# Patient Record
Sex: MALE | Race: WHITE | NOT HISPANIC OR LATINO | Employment: OTHER | URBAN - METROPOLITAN AREA
[De-identification: names, ages, dates, MRNs, and addresses within clinical notes are randomized per-mention and may not be internally consistent; named-entity substitution may affect disease eponyms.]

---

## 2023-02-21 PROBLEM — R07.81 RIB PAIN: Status: ACTIVE | Noted: 2023-02-21

## 2023-02-21 PROBLEM — M25.429: Status: ACTIVE | Noted: 2023-02-21

## 2023-02-21 PROBLEM — M54.12 CERVICAL RADICULOPATHY: Status: ACTIVE | Noted: 2023-02-21

## 2023-02-21 PROBLEM — R53.83 FATIGUE: Status: ACTIVE | Noted: 2023-02-21

## 2023-02-21 PROBLEM — M81.0 OSTEOPOROSIS: Status: ACTIVE | Noted: 2023-02-21

## 2023-02-21 PROBLEM — G95.89 CERVICAL SPINAL MASS (MULTI): Status: ACTIVE | Noted: 2023-02-21

## 2023-02-21 PROBLEM — R30.0 DYSURIA: Status: ACTIVE | Noted: 2023-02-21

## 2023-02-21 PROBLEM — S01.01XA LACERATION OF SCALP WITHOUT FOREIGN BODY: Status: ACTIVE | Noted: 2023-02-21

## 2023-02-21 PROBLEM — N28.9 RENAL INSUFFICIENCY: Status: ACTIVE | Noted: 2023-02-21

## 2023-02-21 PROBLEM — E55.9 VITAMIN D DEFICIENCY: Status: ACTIVE | Noted: 2023-02-21

## 2023-02-21 PROBLEM — E78.5 HYPERLIPEMIA: Status: ACTIVE | Noted: 2023-02-21

## 2023-02-21 PROBLEM — R52 PAIN: Status: ACTIVE | Noted: 2023-02-21

## 2023-02-21 PROBLEM — M25.529 JOINT PAIN, ELBOW: Status: ACTIVE | Noted: 2023-02-21

## 2023-02-21 PROBLEM — G80.9 CEREBRAL PALSY (MULTI): Status: ACTIVE | Noted: 2023-02-21

## 2023-02-21 PROBLEM — E03.9 HYPOTHYROIDISM: Status: ACTIVE | Noted: 2023-02-21

## 2023-02-21 PROBLEM — E53.8 VITAMIN B12 DEFICIENCY: Status: ACTIVE | Noted: 2023-02-21

## 2023-02-21 PROBLEM — M54.6 THORACIC BACK PAIN: Status: ACTIVE | Noted: 2023-02-21

## 2023-02-21 PROBLEM — R26.81 GAIT INSTABILITY: Status: ACTIVE | Noted: 2023-02-21

## 2023-02-21 PROBLEM — R35.1 NOCTURIA: Status: ACTIVE | Noted: 2023-02-21

## 2023-02-21 PROBLEM — L89.90 PRESSURE ULCER: Status: ACTIVE | Noted: 2023-02-21

## 2023-02-21 PROBLEM — U07.1 COVID-19: Status: ACTIVE | Noted: 2023-02-21

## 2023-02-21 RX ORDER — LEVOTHYROXINE SODIUM 100 UG/1
1 TABLET ORAL DAILY
COMMUNITY
Start: 2013-01-31 | End: 2023-05-15 | Stop reason: SDUPTHER

## 2023-02-21 RX ORDER — PNV NO.95/FERROUS FUM/FOLIC AC 28MG-0.8MG
1 TABLET ORAL DAILY
COMMUNITY
Start: 2015-03-09

## 2023-02-21 RX ORDER — MULTIVITAMIN
TABLET ORAL
COMMUNITY

## 2023-03-23 ENCOUNTER — OFFICE VISIT (OUTPATIENT)
Dept: PRIMARY CARE | Facility: CLINIC | Age: 63
End: 2023-03-23
Payer: MEDICARE

## 2023-03-23 VITALS
HEART RATE: 71 BPM | HEIGHT: 60 IN | SYSTOLIC BLOOD PRESSURE: 128 MMHG | BODY MASS INDEX: 28.47 KG/M2 | OXYGEN SATURATION: 98 % | WEIGHT: 145 LBS | DIASTOLIC BLOOD PRESSURE: 88 MMHG

## 2023-03-23 DIAGNOSIS — M54.12 CERVICAL RADICULOPATHY: ICD-10-CM

## 2023-03-23 DIAGNOSIS — L89.142: ICD-10-CM

## 2023-03-23 DIAGNOSIS — R35.1 NOCTURIA: ICD-10-CM

## 2023-03-23 DIAGNOSIS — D50.0 IRON DEFICIENCY ANEMIA DUE TO CHRONIC BLOOD LOSS: ICD-10-CM

## 2023-03-23 DIAGNOSIS — R30.0 DYSURIA: ICD-10-CM

## 2023-03-23 DIAGNOSIS — E03.9 ACQUIRED HYPOTHYROIDISM: ICD-10-CM

## 2023-03-23 DIAGNOSIS — M81.0 AGE RELATED OSTEOPOROSIS, UNSPECIFIED PATHOLOGICAL FRACTURE PRESENCE: ICD-10-CM

## 2023-03-23 DIAGNOSIS — E55.9 VITAMIN D DEFICIENCY: ICD-10-CM

## 2023-03-23 DIAGNOSIS — E78.2 MIXED HYPERLIPIDEMIA: ICD-10-CM

## 2023-03-23 DIAGNOSIS — G80.2 SPASTIC HEMIPLEGIC CEREBRAL PALSY (MULTI): Primary | ICD-10-CM

## 2023-03-23 DIAGNOSIS — E53.8 VITAMIN B12 DEFICIENCY: ICD-10-CM

## 2023-03-23 DIAGNOSIS — G95.89 CERVICAL SPINAL MASS (MULTI): ICD-10-CM

## 2023-03-23 DIAGNOSIS — R26.81 GAIT INSTABILITY: ICD-10-CM

## 2023-03-23 DIAGNOSIS — N28.9 RENAL INSUFFICIENCY: ICD-10-CM

## 2023-03-23 DIAGNOSIS — R52 PAIN: ICD-10-CM

## 2023-03-23 PROBLEM — U07.1 COVID-19: Status: RESOLVED | Noted: 2023-02-21 | Resolved: 2023-03-23

## 2023-03-23 PROBLEM — M54.6 THORACIC BACK PAIN: Status: RESOLVED | Noted: 2023-02-21 | Resolved: 2023-03-23

## 2023-03-23 PROBLEM — R07.81 RIB PAIN: Status: RESOLVED | Noted: 2023-02-21 | Resolved: 2023-03-23

## 2023-03-23 PROBLEM — M25.429: Status: RESOLVED | Noted: 2023-02-21 | Resolved: 2023-03-23

## 2023-03-23 PROBLEM — M25.529 JOINT PAIN, ELBOW: Status: RESOLVED | Noted: 2023-02-21 | Resolved: 2023-03-23

## 2023-03-23 PROBLEM — S01.01XA LACERATION OF SCALP WITHOUT FOREIGN BODY: Status: RESOLVED | Noted: 2023-02-21 | Resolved: 2023-03-23

## 2023-03-23 PROBLEM — R53.83 FATIGUE: Status: RESOLVED | Noted: 2023-02-21 | Resolved: 2023-03-23

## 2023-03-23 PROCEDURE — 1036F TOBACCO NON-USER: CPT | Performed by: INTERNAL MEDICINE

## 2023-03-23 PROCEDURE — 99497 ADVNCD CARE PLAN 30 MIN: CPT | Performed by: INTERNAL MEDICINE

## 2023-03-23 PROCEDURE — G0444 DEPRESSION SCREEN ANNUAL: HCPCS | Performed by: INTERNAL MEDICINE

## 2023-03-23 PROCEDURE — 99213 OFFICE O/P EST LOW 20 MIN: CPT | Performed by: INTERNAL MEDICINE

## 2023-03-23 RX ORDER — CYANOCOBALAMIN 1000 UG/ML
1000 INJECTION, SOLUTION INTRAMUSCULAR; SUBCUTANEOUS ONCE
COMMUNITY
Start: 2008-10-14 | End: 2023-06-26 | Stop reason: ALTCHOICE

## 2023-03-23 ASSESSMENT — PATIENT HEALTH QUESTIONNAIRE - PHQ9
1. LITTLE INTEREST OR PLEASURE IN DOING THINGS: NOT AT ALL
2. FEELING DOWN, DEPRESSED OR HOPELESS: NOT AT ALL
SUM OF ALL RESPONSES TO PHQ9 QUESTIONS 1 AND 2: 0

## 2023-03-23 NOTE — PROGRESS NOTES
Patient ID: Jason Cortes is a 62 y.o. male who presents for Establish Care (Here to establish care ).    Assessment/Plan     Problem List Items Addressed This Visit          Nervous    Cerebral palsy (CMS/HCC) - Primary     Left-sided spasticity advised muscle relaxant therapy         Relevant Orders    CBC and Auto Differential    Comprehensive Metabolic Panel    Cervical radiculopathy     B12 folic acid physical therapy gabapentin         Dysuria     Check PSA urine         RESOLVED: Pain       Genitourinary    Renal insufficiency     Condyle salt and protein BMP         Relevant Orders    Magnesium       Musculoskeletal    Cervical spinal mass (CMS/HCC)     Dr. Cox evaluation         Osteoporosis     Vitamin C vitamin D calcium plus rheumatology evaluation         Relevant Orders    Vitamin D, Total       Endocrine/Metabolic    Hypothyroidism     Continue Synthroid check TSH         Relevant Orders    TSH with reflex to Free T4 if abnormal    Vitamin B12 deficiency     Supplement         Relevant Orders    Vitamin B12    Iron and TIBC    Vitamin D deficiency     Advice your vitamin D level is low take vitamin C calcium plus vitamin D 50,000 unit one every week for 3 months and repeat testing or 3 months         Relevant Orders    Vitamin D, Total       Other    Gait instability    Hyperlipemia     Low-fat diet         Nocturia    Pressure ulcer     Advised bacitracin cream normal saline's local dressing wound care evaluation stage second          Other Visit Diagnoses       Iron deficiency anemia due to chronic blood loss        Relevant Orders    Iron and TIBC            Source of history: Nurse, Medical personnel, Medical record, Patient.  History limitation: None.      HPI cerebral palsy with a left hemispasticity debility ambulatory crusting pressure ulcer with the muscle mass loss cervical lumbar radiculopathy chronic anemia associate with hypothyroidism hyperlipidemia complaining arthralgia myalgia  fatigue tired muscle spasm weakness and pressure ulcer    Negative for fall    Negative for hematuria rectal bleeding    Negative for suicide    No Known Allergies    Medications    Current Outpatient Medications   Medication Sig Dispense Refill    cyanocobalamin (Vitamin B-12) 1,000 mcg/mL injection Inject 1 mL (1,000 mcg) as directed 1 time.      cyanocobalamin (Vitamin B-12) 100 mcg tablet Take 1 tablet (100 mcg) by mouth once daily.      levothyroxine (Synthroid, Levoxyl) 100 mcg tablet Take 1 tablet (100 mcg) by mouth once daily.      multivitamin tablet Multiple Vitamins TABS   Refills: 0       Active       No current facility-administered medications for this visit.       Objective   Visit Vitals  /88   Pulse 71   Ht 1.524 m (5')   Wt 65.8 kg (145 lb)   SpO2 98%   BMI 28.32 kg/m²   Smoking Status Never   BSA 1.67 m²       PHYSICAL EXAM  General: Cerebral palsy  HEENT: PERRLA. EOMI. MMM. Nares patent bl.  Cardiovascular: Heart murmur  Respiratory: Crackles arthritis of spine and hip  GI: Soft, NT abdomen. BS present x 4.   : No CVAT BL  MSK: ROM x 4. CTLS non-tender.   Extremities: Left upper lower extremity spasticity deformity.   Skin: Pressure ulcer left sacrum stage II  Neuro: Cerebral palsy psych: Mild depression     ROS arthralgia myalgia fatigue cachexia skin irritation some neuromuscular dysfunction with the spasticity secondary to neuropathy protein calorie malnutrition pressure ulcer cerebral palsy  Immunization History   Administered Date(s) Administered    Influenza, Unspecified 10/22/2022    Moderna SARS-CoV-2 Booster 10/27/2022    Moderna SARS-CoV-2 Vaccination 02/06/2021, 03/06/2021, 11/04/2021, 04/19/2022    Moderna Sars-cov-2 Bivalent Booster 10/27/2022    Tdap 07/25/2002, 08/25/2015       No visits with results within 4 Month(s) from this visit.   Latest known visit with results is:   Legacy Encounter on 11/03/2022   Component Date Value Ref Range Status    TSH 11/03/2022 2.62  0.44 -  3.98 mIU/L Final    WBC 11/03/2022 5.4  4.4 - 11.3 x10E9/L Final    nRBC 11/03/2022 0.0  0.0 - 0.0 /100 WBC Final    RBC 11/03/2022 4.71  4.50 - 5.90 x10E12/L Final    Hemoglobin 11/03/2022 13.9  13.5 - 17.5 g/dL Final    Hematocrit 11/03/2022 41.6  41.0 - 52.0 % Final    MCV 11/03/2022 88  80 - 100 fL Final    MCHC 11/03/2022 33.4  32.0 - 36.0 g/dL Final    Platelets 11/03/2022 210  150 - 450 x10E9/L Final    RDW 11/03/2022 12.9  11.5 - 14.5 % Final    Neutrophils % 11/03/2022 62.7  40.0 - 80.0 % Final    Immature Granulocytes %, Automated 11/03/2022 0.2  0.0 - 0.9 % Final    Lymphocytes % 11/03/2022 22.5  13.0 - 44.0 % Final    Monocytes % 11/03/2022 12.0  2.0 - 10.0 % Final    Eosinophils % 11/03/2022 2.6  0.0 - 6.0 % Final    Basophils % 11/03/2022 0.0  0.0 - 2.0 % Final    Neutrophils Absolute 11/03/2022 3.41  1.20 - 7.70 x10E9/L Final    Lymphocytes Absolute 11/03/2022 1.22  1.20 - 4.80 x10E9/L Final    Monocytes Absolute 11/03/2022 0.65  0.10 - 1.00 x10E9/L Final    Eosinophils Absolute 11/03/2022 0.14  0.00 - 0.70 x10E9/L Final    Basophils Absolute 11/03/2022 0.00  0.00 - 0.10 x10E9/L Final    Glucose 11/03/2022 81  74 - 99 mg/dL Final    Sodium 11/03/2022 142  136 - 145 mmol/L Final    Potassium 11/03/2022 4.3  3.5 - 5.3 mmol/L Final    Chloride 11/03/2022 105  98 - 107 mmol/L Final    Bicarbonate 11/03/2022 29  21 - 32 mmol/L Final    Anion Gap 11/03/2022 12  10 - 20 mmol/L Final    Urea Nitrogen 11/03/2022 16  6 - 23 mg/dL Final    Creatinine 11/03/2022 0.90  0.50 - 1.30 mg/dL Final    GFR MALE 11/03/2022 >90  >90 mL/min/1.73m2 Final    Calcium 11/03/2022 9.0  8.6 - 10.6 mg/dL Final    Albumin 11/03/2022 4.1  3.4 - 5.0 g/dL Final    Alkaline Phosphatase 11/03/2022 52  33 - 136 U/L Final    Total Protein 11/03/2022 6.2 (L)  6.4 - 8.2 g/dL Final    AST 11/03/2022 19  9 - 39 U/L Final    Total Bilirubin 11/03/2022 0.5  0.0 - 1.2 mg/dL Final    ALT (SGPT) 11/03/2022 20  10 - 52 U/L Final    Vitamin B-12  11/03/2022 1,137 (H)  211 - 911 pg/mL Final    Free T4 11/03/2022 1.32  0.78 - 1.48 ng/dL Final    Cholesterol 11/03/2022 170  0 - 199 mg/dL Final    HDL 11/03/2022 37.4 (A)  mg/dL Final    Cholesterol/HDL Ratio 11/03/2022 4.5   Final    LDL 11/03/2022 105 (H)  0 - 99 mg/dL Final    VLDL 11/03/2022 27  0 - 40 mg/dL Final    Triglycerides 11/03/2022 136  0 - 149 mg/dL Final       Radiology: Reviewed imaging in powerchart.  No results found.    Family History   Problem Relation Name Age of Onset    Pancreatic cancer Mother      Hypertension Other Fam Hx     Other (Malignant Neoplasm) Other Fam Hx      Social History     Socioeconomic History    Marital status: Single     Spouse name: None    Number of children: None    Years of education: None    Highest education level: None   Occupational History    None   Tobacco Use    Smoking status: Never    Smokeless tobacco: Never   Vaping Use    Vaping status: None   Substance and Sexual Activity    Alcohol use: Yes    Drug use: None    Sexual activity: None   Other Topics Concern    None   Social History Narrative    None     Social Determinants of Health     Financial Resource Strain: Not on file   Food Insecurity: Not on file   Transportation Needs: Not on file   Physical Activity: Not on file   Stress: Not on file   Social Connections: Not on file   Intimate Partner Violence: Not on file   Housing Stability: Not on file     History reviewed. No pertinent past medical history.  History reviewed. No pertinent surgical history.  * Cannot find OR log *    Charting was completed using voice recognition technology and may include unintended errors.

## 2023-05-15 DIAGNOSIS — E03.9 ACQUIRED HYPOTHYROIDISM: ICD-10-CM

## 2023-05-15 RX ORDER — LEVOTHYROXINE SODIUM 100 UG/1
100 TABLET ORAL DAILY
Qty: 90 TABLET | Refills: 3 | Status: SHIPPED | OUTPATIENT
Start: 2023-05-15

## 2023-05-16 ENCOUNTER — TELEPHONE (OUTPATIENT)
Dept: PRIMARY CARE | Facility: CLINIC | Age: 63
End: 2023-05-16

## 2023-06-26 ENCOUNTER — LAB (OUTPATIENT)
Dept: LAB | Facility: LAB | Age: 63
End: 2023-06-26
Payer: MEDICARE

## 2023-06-26 ENCOUNTER — OFFICE VISIT (OUTPATIENT)
Dept: PRIMARY CARE | Facility: CLINIC | Age: 63
End: 2023-06-26
Payer: MEDICARE

## 2023-06-26 VITALS
OXYGEN SATURATION: 98 % | HEART RATE: 74 BPM | TEMPERATURE: 97.8 F | SYSTOLIC BLOOD PRESSURE: 127 MMHG | DIASTOLIC BLOOD PRESSURE: 75 MMHG

## 2023-06-26 DIAGNOSIS — Z12.11 COLON CANCER SCREENING: ICD-10-CM

## 2023-06-26 DIAGNOSIS — L89.002: ICD-10-CM

## 2023-06-26 DIAGNOSIS — Z13.820 SCREENING FOR OSTEOPOROSIS: ICD-10-CM

## 2023-06-26 DIAGNOSIS — R73.9 HYPERGLYCEMIA: ICD-10-CM

## 2023-06-26 DIAGNOSIS — E55.9 VITAMIN D DEFICIENCY: ICD-10-CM

## 2023-06-26 DIAGNOSIS — G80.1 SPASTIC DIPLEGIC CEREBRAL PALSY (MULTI): Primary | ICD-10-CM

## 2023-06-26 DIAGNOSIS — M81.0 OSTEOPOROSIS: ICD-10-CM

## 2023-06-26 DIAGNOSIS — G81.10: ICD-10-CM

## 2023-06-26 DIAGNOSIS — L30.9 ECZEMA, UNSPECIFIED TYPE: ICD-10-CM

## 2023-06-26 LAB
ESTIMATED AVERAGE GLUCOSE FOR HBA1C: 103 MG/DL
HEMOGLOBIN A1C/HEMOGLOBIN TOTAL IN BLOOD: 5.2 %

## 2023-06-26 PROCEDURE — 99214 OFFICE O/P EST MOD 30 MIN: CPT | Performed by: INTERNAL MEDICINE

## 2023-06-26 PROCEDURE — 36415 COLL VENOUS BLD VENIPUNCTURE: CPT

## 2023-06-26 PROCEDURE — 1036F TOBACCO NON-USER: CPT | Performed by: INTERNAL MEDICINE

## 2023-06-26 PROCEDURE — 83036 HEMOGLOBIN GLYCOSYLATED A1C: CPT

## 2023-06-26 RX ORDER — FLUOCINONIDE 0.5 MG/G
CREAM TOPICAL 2 TIMES DAILY
Qty: 30 G | Refills: 0 | Status: SHIPPED | OUTPATIENT
Start: 2023-06-26 | End: 2023-10-03 | Stop reason: ALTCHOICE

## 2023-06-26 NOTE — PROGRESS NOTES
Patient ID: Jason Cortes is a 62 y.o. male who presents for Follow-up (3 month ).    Assessment/Plan     Problem List Items Addressed This Visit          Nervous    Cerebral palsy (CMS/HCC) - Primary     Neuropsych evaluation         Spastic hemiplegia due to noncerebrovascular etiology, unspecified laterality (CMS/HCC)     Controlled BMI blood pressure LDL cholesterol hemoglobin A1c PT OT speech therapy fall prevention            Musculoskeletal    Osteoporosis     Vitamin C vitamin D calcium Prolia advised to get DEXA scan         Relevant Orders    XR DEXA bone density       Endocrine/Metabolic    Vitamin D deficiency       Infectious/Inflammatory    Eczema       Other    Pressure ulcer     Wound care evaluation          Other Visit Diagnoses       Hyperglycemia        Relevant Orders    Hemoglobin A1C    Cologuard® colon cancer screening    Screening for osteoporosis        Relevant Orders    Hemoglobin A1C    XR DEXA bone density    Cologuard® colon cancer screening    Colon cancer screening        Relevant Orders    Hemoglobin A1C    Cologuard® colon cancer screening          Patient was evaluated today, problem list was reviewed, problems and concerns addressed, Rx list reviewed and updated, lab and tests were noted and reviewed. Life style changes were discussed, always it works better if we eat plant based diet and plenty of fibres and roughage. Consume adequate amount of water and avoid alcohol, light to moderate physical activities and stress reduction are always beneficial for ongoing physical well being. Do not forget to have 6 to 7 hours of sleep regularly and avoid late night parker screen exposure.    Source of history: Nurse, Medical personnel, Medical record, Patient.  History limitation: None.      HPI cerebral palsy with a left hemispasticity debility ambulatory crusting pressure ulcer with the muscle mass loss cervical lumbar radiculopathy chronic anemia associate with hypothyroidism hyperlipidemia  complaining arthralgia myalgia fatigue tired muscle spasm weakness and pressure ulcer  Nonspecific skin eczema advised Lidex cream locally  Negative for fall    Negative for hematuria rectal bleeding    Negative for suicide    No Known Allergies    Medications    Current Outpatient Medications   Medication Sig Dispense Refill    cyanocobalamin (Vitamin B-12) 100 mcg tablet Take 1 tablet (100 mcg) by mouth once daily.      levothyroxine (Synthroid, Levoxyl) 100 mcg tablet Take 1 tablet (100 mcg) by mouth once daily. 90 tablet 3    multivitamin tablet Multiple Vitamins TABS   Refills: 0       Active       No current facility-administered medications for this visit.       Objective   Visit Vitals  /75 (BP Location: Right arm, Patient Position: Sitting, BP Cuff Size: Adult)   Pulse 74   Temp 36.6 °C (97.8 °F)   SpO2 98%   Smoking Status Never       PHYSICAL EXAM  General: Cerebral palsy  HEENT: PERRLA. EOMI. MMM. Nares patent bl.  Cardiovascular: Heart murmur  Respiratory: Crackles arthritis of spine and hip  GI: Soft, NT abdomen. BS present x 4.   : No CVAT BL  MSK: ROM x 4. CTLS non-tender.   Extremities: Left upper lower extremity spasticity deformity.   Skin: Pressure ulcer left sacrum stage II eczema of the skin in the hand  Neuro: Cerebral palsy psych: Mild depression     ROS arthralgia myalgia fatigue cachexia skin irritation some neuromuscular dysfunction with the spasticity secondary to neuropathy protein calorie malnutrition pressure ulcer cerebral palsy  Immunization History   Administered Date(s) Administered    Influenza, Unspecified 10/22/2022    Influenza, injectable, quadrivalent, preservative free 08/25/2015, 11/18/2021, 10/22/2022    Moderna SARS-CoV-2 Booster 10/27/2022    Moderna SARS-CoV-2 Vaccination 02/06/2021, 03/06/2021, 11/04/2021, 04/19/2022    Moderna Sars-cov-2 Bivalent Booster 10/27/2022    Novel influenza-H1N1-09, preservative-free 01/24/2010    Tdap 07/25/2002, 04/13/2014,  08/25/2015    Tetanus toxoid, adsorbed 07/25/2002       No visits with results within 4 Month(s) from this visit.   Latest known visit with results is:   Legacy Encounter on 11/03/2022   Component Date Value Ref Range Status    TSH 11/03/2022 2.62  0.44 - 3.98 mIU/L Final    WBC 11/03/2022 5.4  4.4 - 11.3 x10E9/L Final    nRBC 11/03/2022 0.0  0.0 - 0.0 /100 WBC Final    RBC 11/03/2022 4.71  4.50 - 5.90 x10E12/L Final    Hemoglobin 11/03/2022 13.9  13.5 - 17.5 g/dL Final    Hematocrit 11/03/2022 41.6  41.0 - 52.0 % Final    MCV 11/03/2022 88  80 - 100 fL Final    MCHC 11/03/2022 33.4  32.0 - 36.0 g/dL Final    Platelets 11/03/2022 210  150 - 450 x10E9/L Final    RDW 11/03/2022 12.9  11.5 - 14.5 % Final    Neutrophils % 11/03/2022 62.7  40.0 - 80.0 % Final    Immature Granulocytes %, Automated 11/03/2022 0.2  0.0 - 0.9 % Final    Lymphocytes % 11/03/2022 22.5  13.0 - 44.0 % Final    Monocytes % 11/03/2022 12.0  2.0 - 10.0 % Final    Eosinophils % 11/03/2022 2.6  0.0 - 6.0 % Final    Basophils % 11/03/2022 0.0  0.0 - 2.0 % Final    Neutrophils Absolute 11/03/2022 3.41  1.20 - 7.70 x10E9/L Final    Lymphocytes Absolute 11/03/2022 1.22  1.20 - 4.80 x10E9/L Final    Monocytes Absolute 11/03/2022 0.65  0.10 - 1.00 x10E9/L Final    Eosinophils Absolute 11/03/2022 0.14  0.00 - 0.70 x10E9/L Final    Basophils Absolute 11/03/2022 0.00  0.00 - 0.10 x10E9/L Final    Glucose 11/03/2022 81  74 - 99 mg/dL Final    Sodium 11/03/2022 142  136 - 145 mmol/L Final    Potassium 11/03/2022 4.3  3.5 - 5.3 mmol/L Final    Chloride 11/03/2022 105  98 - 107 mmol/L Final    Bicarbonate 11/03/2022 29  21 - 32 mmol/L Final    Anion Gap 11/03/2022 12  10 - 20 mmol/L Final    Urea Nitrogen 11/03/2022 16  6 - 23 mg/dL Final    Creatinine 11/03/2022 0.90  0.50 - 1.30 mg/dL Final    GFR MALE 11/03/2022 >90  >90 mL/min/1.73m2 Final    Calcium 11/03/2022 9.0  8.6 - 10.6 mg/dL Final    Albumin 11/03/2022 4.1  3.4 - 5.0 g/dL Final    Alkaline Phosphatase  11/03/2022 52  33 - 136 U/L Final    Total Protein 11/03/2022 6.2 (L)  6.4 - 8.2 g/dL Final    AST 11/03/2022 19  9 - 39 U/L Final    Total Bilirubin 11/03/2022 0.5  0.0 - 1.2 mg/dL Final    ALT (SGPT) 11/03/2022 20  10 - 52 U/L Final    Vitamin B-12 11/03/2022 1,137 (H)  211 - 911 pg/mL Final    Free T4 11/03/2022 1.32  0.78 - 1.48 ng/dL Final    Cholesterol 11/03/2022 170  0 - 199 mg/dL Final    HDL 11/03/2022 37.4 (A)  mg/dL Final    Cholesterol/HDL Ratio 11/03/2022 4.5   Final    LDL 11/03/2022 105 (H)  0 - 99 mg/dL Final    VLDL 11/03/2022 27  0 - 40 mg/dL Final    Triglycerides 11/03/2022 136  0 - 149 mg/dL Final       Radiology: Reviewed imaging in powerchart.  No results found.    Family History   Problem Relation Name Age of Onset    Pancreatic cancer Mother      Hypertension Other Fam Hx     Other (Malignant Neoplasm) Other Fam Hx      Social History     Socioeconomic History    Marital status: Single     Spouse name: None    Number of children: None    Years of education: None    Highest education level: None   Occupational History    None   Tobacco Use    Smoking status: Never    Smokeless tobacco: Never   Substance and Sexual Activity    Alcohol use: Yes    Drug use: None    Sexual activity: None   Other Topics Concern    None   Social History Narrative    None     Social Determinants of Health     Financial Resource Strain: Not on file   Food Insecurity: Not on file   Transportation Needs: Not on file   Physical Activity: Not on file   Stress: Not on file   Social Connections: Not on file   Intimate Partner Violence: Not on file   Housing Stability: Not on file     History reviewed. No pertinent past medical history.  History reviewed. No pertinent surgical history.  * Cannot find OR log *    Charting was completed using voice recognition technology and may include unintended errors.       Physical Exam  Review of Systems

## 2023-09-26 ENCOUNTER — APPOINTMENT (OUTPATIENT)
Dept: PRIMARY CARE | Facility: CLINIC | Age: 63
End: 2023-09-26
Payer: MEDICARE

## 2023-10-03 ENCOUNTER — OFFICE VISIT (OUTPATIENT)
Dept: PRIMARY CARE | Facility: CLINIC | Age: 63
End: 2023-10-03
Payer: MEDICARE

## 2023-10-03 VITALS
SYSTOLIC BLOOD PRESSURE: 130 MMHG | HEART RATE: 71 BPM | DIASTOLIC BLOOD PRESSURE: 84 MMHG | OXYGEN SATURATION: 97 % | TEMPERATURE: 98.1 F

## 2023-10-03 DIAGNOSIS — L20.84 INTRINSIC ECZEMA: ICD-10-CM

## 2023-10-03 DIAGNOSIS — E78.2 MIXED HYPERLIPIDEMIA: ICD-10-CM

## 2023-10-03 DIAGNOSIS — I73.00 RAYNAUD'S DISEASE WITHOUT GANGRENE: Primary | ICD-10-CM

## 2023-10-03 DIAGNOSIS — G80.1 SPASTIC DIPLEGIC CEREBRAL PALSY (MULTI): ICD-10-CM

## 2023-10-03 DIAGNOSIS — G81.10: ICD-10-CM

## 2023-10-03 DIAGNOSIS — D50.0 IRON DEFICIENCY ANEMIA DUE TO CHRONIC BLOOD LOSS: ICD-10-CM

## 2023-10-03 DIAGNOSIS — N40.0 BENIGN PROSTATIC HYPERPLASIA WITHOUT LOWER URINARY TRACT SYMPTOMS: ICD-10-CM

## 2023-10-03 DIAGNOSIS — N28.9 RENAL INSUFFICIENCY: ICD-10-CM

## 2023-10-03 DIAGNOSIS — E53.8 VITAMIN B12 DEFICIENCY: ICD-10-CM

## 2023-10-03 DIAGNOSIS — E03.9 ACQUIRED HYPOTHYROIDISM: ICD-10-CM

## 2023-10-03 DIAGNOSIS — E55.9 VITAMIN D DEFICIENCY: ICD-10-CM

## 2023-10-03 PROBLEM — E78.5 HYPERLIPEMIA: Status: RESOLVED | Noted: 2023-02-21 | Resolved: 2023-10-03

## 2023-10-03 PROBLEM — M81.0 OSTEOPOROSIS: Status: RESOLVED | Noted: 2023-02-21 | Resolved: 2023-10-03

## 2023-10-03 PROBLEM — R26.81 GAIT INSTABILITY: Status: RESOLVED | Noted: 2023-02-21 | Resolved: 2023-10-03

## 2023-10-03 PROBLEM — L89.90 PRESSURE ULCER: Status: RESOLVED | Noted: 2023-02-21 | Resolved: 2023-10-03

## 2023-10-03 PROBLEM — R35.1 NOCTURIA: Status: RESOLVED | Noted: 2023-02-21 | Resolved: 2023-10-03

## 2023-10-03 PROBLEM — M54.12 CERVICAL RADICULOPATHY: Status: RESOLVED | Noted: 2023-02-21 | Resolved: 2023-10-03

## 2023-10-03 PROBLEM — R30.0 DYSURIA: Status: RESOLVED | Noted: 2023-02-21 | Resolved: 2023-10-03

## 2023-10-03 PROBLEM — G95.89 CERVICAL SPINAL MASS (MULTI): Status: RESOLVED | Noted: 2023-02-21 | Resolved: 2023-10-03

## 2023-10-03 PROCEDURE — 1036F TOBACCO NON-USER: CPT | Performed by: INTERNAL MEDICINE

## 2023-10-03 PROCEDURE — 99214 OFFICE O/P EST MOD 30 MIN: CPT | Performed by: INTERNAL MEDICINE

## 2023-10-03 RX ORDER — NIFEDIPINE 30 MG/1
30 TABLET, FILM COATED, EXTENDED RELEASE ORAL DAILY
Qty: 30 TABLET | Refills: 5 | Status: SHIPPED | OUTPATIENT
Start: 2023-10-03 | End: 2024-03-31

## 2023-10-03 RX ORDER — NYSTATIN AND TRIAMCINOLONE ACETONIDE 100000; 1 [USP'U]/G; MG/G
CREAM TOPICAL 2 TIMES DAILY
Qty: 15 G | Refills: 1 | Status: SHIPPED | OUTPATIENT
Start: 2023-10-03

## 2023-10-03 NOTE — PROGRESS NOTES
Subjective   Patient ID: Jason Cortes is a 62 y.o. male who presents for Follow-up (3 month /From the knees down when he is cold his legs turn purple/Needs an order for AFO /Wound on left hand /Spot on back wants looked at /Fungus between his toes ).    Assessment/Plan     Problem List Items Addressed This Visit       Cerebral palsy (CMS/HCC)     Continue work that make him enjoyment         Relevant Orders    Albumin , Urine Random    CBC and Auto Differential    Comprehensive Metabolic Panel    Lipid Panel    Magnesium    TSH with reflex to Free T4 if abnormal    Uric Acid    Urinalysis Microscopic Only    Urine Culture    Prostate Specific Antigen, Screen    Folate    Haptoglobin    Iron and TIBC    Vitamin B12    RESOLVED: Hyperlipemia    Relevant Orders    Albumin , Urine Random    CBC and Auto Differential    Comprehensive Metabolic Panel    Lipid Panel    Magnesium    TSH with reflex to Free T4 if abnormal    Uric Acid    Urinalysis Microscopic Only    Urine Culture    Prostate Specific Antigen, Screen    Folate    Haptoglobin    Iron and TIBC    Vitamin B12    RESOLVED: Hypothyroidism    Relevant Orders    Albumin , Urine Random    CBC and Auto Differential    Comprehensive Metabolic Panel    Lipid Panel    Magnesium    TSH with reflex to Free T4 if abnormal    Uric Acid    Urinalysis Microscopic Only    Urine Culture    Prostate Specific Antigen, Screen    Folate    Haptoglobin    Iron and TIBC    Vitamin B12    Renal insufficiency     CKD and various stages of CKD and significance explained, CKD is very common and rising diagnosis among [place] adults. Main culprits for CKD etiology needs to be attended well. GFR values explained. Nephrotoxic agents has to be avoided, plenty of water consumption is always beneficial. Avoid NSAIDs, always check with MD about usage of OTC medications or any other Rx given by other providers. GFR less then 10 usually will need renal replacement therapy. Episodic check on renal  functions needed. Always ask MD about GFR at next visit. Avoid dehydration.          Relevant Orders    Albumin , Urine Random    CBC and Auto Differential    Comprehensive Metabolic Panel    Lipid Panel    Magnesium    TSH with reflex to Free T4 if abnormal    Uric Acid    Urinalysis Microscopic Only    Urine Culture    Prostate Specific Antigen, Screen    Folate    Haptoglobin    Iron and TIBC    Vitamin B12    RESOLVED: Vitamin B12 deficiency    Relevant Orders    Albumin , Urine Random    CBC and Auto Differential    Comprehensive Metabolic Panel    Lipid Panel    Magnesium    TSH with reflex to Free T4 if abnormal    Uric Acid    Urinalysis Microscopic Only    Urine Culture    Prostate Specific Antigen, Screen    Folate    Haptoglobin    Iron and TIBC    Vitamin B12    Vitamin D deficiency     Advice your vitamin D level is low take vitamin C calcium plus vitamin D 50,000 unit one every week for 3 months and repeat testing or 3 months         Relevant Orders    Albumin , Urine Random    CBC and Auto Differential    Comprehensive Metabolic Panel    Lipid Panel    Magnesium    TSH with reflex to Free T4 if abnormal    Uric Acid    Urinalysis Microscopic Only    Urine Culture    Prostate Specific Antigen, Screen    Folate    Haptoglobin    Iron and TIBC    Vitamin B12    Spastic hemiplegia due to noncerebrovascular etiology, unspecified laterality (CMS/HCC)     Follow-up with the geropsych neuropsych         Eczema    Raynaud's disease without gangrene - Primary     For to the rheumatologist given nifedipine pain 10 mg a day autoimmune work-up         Relevant Medications    nystatin-triamcinolone (Mycolog II) cream    NIFEdipine ER (Adalat CC) 30 mg 24 hr tablet    Iron deficiency anemia due to chronic blood loss    Relevant Orders    Iron and TIBC    Benign prostatic hyperplasia without lower urinary tract symptoms    Relevant Orders    Prostate Specific Antigen, Screen     Patient was evaluated today, problem  list was reviewed, problems and concerns addressed, Rx list reviewed and updated, lab and tests were noted and reviewed. Life style changes were discussed, always it works better if we eat plant based diet and plenty of fibres and roughage. Consume adequate amount of water and avoid alcohol, light to moderate physical activities and stress reduction are always beneficial for ongoing physical well being. Do not forget to have 6 to 7 hours of sleep regularly and avoid late night parker screen exposure.    HPI this is a 62-year-old patient have cerebral palsy hypertension hyperlipidemia eczema deformity on the left side more than the right side associated with the pressure affecting the toes and fingers bilaterally discoloration of the lower extremity with cold discoloration affected by the weather after taking shower before taking shower winter versus summertime look like more kind of Raynaud's disease    Mother have pancreatic cancer father have dementia    Negative family history of lupus from sarcoidosis    Personal history of B12 deficiency vitamin D deficiency eczema hypothyroidism cerebral palsy    Negative for suicide fall or seizure    Patient to have a dry scratchy area on the back does not look like anything related to malignancy looks kind of benign we will closely watch every 3 to 6 months.  Patient to have muscle weakness requiring AFO prescription was given discussed about the power of  they will check with the  and   History reviewed. No pertinent past medical history.  History reviewed. No pertinent surgical history.  No Known Allergies  Current Outpatient Medications   Medication Sig Dispense Refill    levothyroxine (Synthroid, Levoxyl) 100 mcg tablet Take 1 tablet (100 mcg) by mouth once daily. 90 tablet 3    multivitamin tablet Multiple Vitamins TABS   Refills: 0       Active      cyanocobalamin (Vitamin B-12) 100 mcg tablet Take 1 tablet (100 mcg) by mouth once daily.       NIFEdipine ER (Adalat CC) 30 mg 24 hr tablet Take 1 tablet (30 mg) by mouth once daily. Do not crush, chew, or split. 30 tablet 5    nystatin-triamcinolone (Mycolog II) cream Apply topically 2 times a day. Apply to affect area twice a day for 7-14 days then as needed for rash. 15 g 1     No current facility-administered medications for this visit.     Family History   Problem Relation Name Age of Onset    Pancreatic cancer Mother      Hypertension Other Fam Hx     Other (Malignant Neoplasm) Other Fam Hx      Social History     Socioeconomic History    Marital status: Single     Spouse name: None    Number of children: None    Years of education: None    Highest education level: None   Occupational History    None   Tobacco Use    Smoking status: Never    Smokeless tobacco: Never   Substance and Sexual Activity    Alcohol use: Yes    Drug use: None    Sexual activity: None   Other Topics Concern    None   Social History Narrative    None     Social Determinants of Health     Financial Resource Strain: Not on file   Food Insecurity: Not on file   Transportation Needs: Not on file   Physical Activity: Not on file   Stress: Not on file   Social Connections: Not on file   Intimate Partner Violence: Not on file   Housing Stability: Not on file     Immunization History   Administered Date(s) Administered    Flu vaccine (IIV4), preservative free *Check age/dose* 08/25/2015, 11/18/2021, 10/22/2022    Influenza, Unspecified 10/22/2022    Moderna SARS-CoV-2 Booster 10/27/2022    Moderna SARS-CoV-2 Vaccination 02/06/2021, 03/06/2021, 11/04/2021, 04/19/2022    Novel influenza-H1N1-09, preservative-free 01/24/2010    Tdap vaccine, age 7 year and older (BOOSTRIX) 07/25/2002, 04/13/2014, 08/25/2015    Tetanus toxoid, adsorbed 07/25/2002       Review of Systems  Review of systems is otherwise negative unless stated above or in history of present illness.    Objective   Visit Vitals  /84 (BP Location: Right arm, Patient  Position: Sitting, BP Cuff Size: Adult)   Pulse 71   Temp 36.7 °C (98.1 °F)   SpO2 97%   Smoking Status Never     Physical Exam  Constitutional: Cerebral palsy     General: not in acute distress.   HENT:      Head: Normocephalic and atraumatic.      Nose: Nose normal.   Eyes: Eyeglasses     Extraocular Movements: Extraocular movements intact.      Conjunctiva/sclera: Conjunctivae normal.   Cardiovascular: Heart murmur     Rate and Rhythm: Normal rate ,  No M/R/G  Pulmonary:      Effort: Pulmonary effort is normal.      Breath sounds: Normal, Bilat Equal AE  Skin: History infection     General: Skin is warm.   Neurological: Cerebral palsy     Mental Status: He is alert and oriented to person, place, and time.   Psychiatric:   PTSD   Mood and Affect: Mood normal.         Behavior: Behavior normal.   Musculoskeletal muscle mass loss  FROM in all extremitirs,  Joint-no swelling or tenderness    Lab on 06/26/2023   Component Date Value Ref Range Status    Hemoglobin A1C 06/26/2023 5.2  % Final    Estimated Average Glucose 06/26/2023 103  MG/DL Final       Radiology: Reviewed imaging in powerchart.  No results found.      Charting was completed using voice recognition technology and may include unintended errors.

## 2023-10-05 ENCOUNTER — TELEPHONE (OUTPATIENT)
Dept: PRIMARY CARE | Facility: CLINIC | Age: 63
End: 2023-10-05
Payer: MEDICARE

## 2023-10-05 DIAGNOSIS — E78.2 MIXED HYPERLIPIDEMIA: Primary | ICD-10-CM

## 2023-10-05 NOTE — TELEPHONE ENCOUNTER
"Tried calling pt with lab results, no answer. LVM for pt to return call. Will try to call again later.     Per Dr. Haddad \" Magnesium 2.48 high  high glucose 65 low  No magnesium supplement  Crestor 2.5 mg a day #90  Eat fruits and vegetable  Follow-up 3 months \"  "

## 2023-10-06 RX ORDER — ROSUVASTATIN CALCIUM 5 MG/1
2.5 TABLET, COATED ORAL DAILY
Qty: 45 TABLET | Refills: 0 | Status: SHIPPED | OUTPATIENT
Start: 2023-10-06 | End: 2024-04-03

## 2023-11-13 DIAGNOSIS — E03.9 ACQUIRED HYPOTHYROIDISM: ICD-10-CM

## 2023-11-14 ENCOUNTER — TELEPHONE (OUTPATIENT)
Dept: PRIMARY CARE | Facility: CLINIC | Age: 63
End: 2023-11-14
Payer: MEDICARE

## 2024-01-30 ENCOUNTER — PROCEDURE VISIT (OUTPATIENT)
Dept: PODIATRY | Facility: CLINIC | Age: 64
End: 2024-01-30
Payer: MEDICARE

## 2024-01-30 DIAGNOSIS — M79.674 PAIN IN TOES OF BOTH FEET: ICD-10-CM

## 2024-01-30 DIAGNOSIS — G80.2 SPASTIC HEMIPLEGIC CEREBRAL PALSY (MULTI): Primary | ICD-10-CM

## 2024-01-30 DIAGNOSIS — M79.675 PAIN IN TOES OF BOTH FEET: ICD-10-CM

## 2024-01-30 DIAGNOSIS — B35.1 ONYCHOMYCOSIS: ICD-10-CM

## 2024-01-30 PROCEDURE — 11721 DEBRIDE NAIL 6 OR MORE: CPT | Performed by: PODIATRIST

## 2024-01-30 NOTE — PROGRESS NOTES
History Of Present Illness  Jason Cortes is a 63 y.o. male presenting with painful elongated nails.     Past Medical History  He has no past medical history on file.    Surgical History  He has no past surgical history on file.     Social History  He reports that he has never smoked. He has never used smokeless tobacco. He reports current alcohol use. No history on file for drug use.    Family History  Family History   Problem Relation Name Age of Onset    Pancreatic cancer Mother      Hypertension Other Fam Hx     Other (Malignant Neoplasm) Other Fam Hx         Allergies  Patient has no known allergies.    Medications  Current Outpatient Medications   Medication Sig Dispense Refill    cyanocobalamin (Vitamin B-12) 100 mcg tablet Take 1 tablet (100 mcg) by mouth once daily.      levothyroxine (Synthroid, Levoxyl) 100 mcg tablet Take 1 tablet (100 mcg) by mouth once daily. 90 tablet 3    multivitamin tablet Multiple Vitamins TABS   Refills: 0       Active      NIFEdipine ER (Adalat CC) 30 mg 24 hr tablet Take 1 tablet (30 mg) by mouth once daily. Do not crush, chew, or split. 30 tablet 5    nystatin-triamcinolone (Mycolog II) cream Apply topically 2 times a day. Apply to affect area twice a day for 7-14 days then as needed for rash. 15 g 1    rosuvastatin (Crestor) 5 mg tablet Take 0.5 tablets (2.5 mg) by mouth once daily. 45 tablet 0     No current facility-administered medications for this visit.       Review of Systems    REVIEW OF SYSTEMS  GENERAL:  Negative for malaise, significant weight loss, fever  CARDIOVASCULAR: leg swelling   MUSCULOSKELETAL:  Negative for joint pain or swelling, back pain, and muscle pain.  SKIN:  Negative for lesions, rash, and itching  PSYCH:  Negative for sleep disturbance, mood disorder and recent psychosocial stressors  NEURO: Negative, denies any burning, tingling or numbness     Objective:   Vasc: DP and PT pulses are palpable bilateral.  CFT is less than 3 seconds bilateral.  Skin  temperature is warm to cool proximal to distal bilateral.      Neuro:  Light touch is intact to the foot bilateral.      Derm: Nails 1-5 bilateral are thickened, elongated and crumbly with subungual debris. Skin is supple with normal texture and turgor noted.  Webspaces are clean, dry and intact bilateral.  There are no hyperkeratoses, ulcerations, verruca or other lesions noted.      Ortho: Muscle strength is 3/5 for all pedal groups tested.  Wheelchair bound   Assessment/Plan     Diagnoses and all orders for this visit:  Spastic hemiplegic cerebral palsy (CMS/HCC)  Onychomycosis  Pain in toes of both feet    Toenails are debrided in length and thickness to avoid infection and for pain relief    Samia Vazquez-Chantell, KASSIDY  [address] [place] [address]

## 2024-04-11 ENCOUNTER — APPOINTMENT (OUTPATIENT)
Dept: PODIATRY | Facility: CLINIC | Age: 64
End: 2024-04-11
Payer: MEDICARE

## 2024-05-02 ENCOUNTER — PROCEDURE VISIT (OUTPATIENT)
Dept: PODIATRY | Facility: CLINIC | Age: 64
End: 2024-05-02
Payer: MEDICARE

## 2024-05-02 DIAGNOSIS — G80.2 SPASTIC HEMIPLEGIC CEREBRAL PALSY (MULTI): Primary | ICD-10-CM

## 2024-05-02 DIAGNOSIS — M79.675 PAIN IN TOES OF BOTH FEET: ICD-10-CM

## 2024-05-02 DIAGNOSIS — M79.674 PAIN IN TOES OF BOTH FEET: ICD-10-CM

## 2024-05-02 DIAGNOSIS — B35.1 ONYCHOMYCOSIS: ICD-10-CM

## 2024-05-02 PROCEDURE — 11721 DEBRIDE NAIL 6 OR MORE: CPT | Performed by: PODIATRIST

## 2024-05-02 NOTE — PROGRESS NOTES
History Of Present Illness  Jason Cortes is a 63 y.o. male presenting with painful elongated nails.     Past Medical History  He has no past medical history on file.    Surgical History  He has no past surgical history on file.     Social History  He reports that he has never smoked. He has never used smokeless tobacco. He reports current alcohol use. No history on file for drug use.    Family History  Family History   Problem Relation Name Age of Onset    Pancreatic cancer Mother      Hypertension Other Fam Hx     Other (Malignant Neoplasm) Other Fam Hx         Allergies  Patient has no known allergies.    Medications  Current Outpatient Medications   Medication Sig Dispense Refill    cyanocobalamin (Vitamin B-12) 100 mcg tablet Take 1 tablet (100 mcg) by mouth once daily.      levothyroxine (Synthroid, Levoxyl) 100 mcg tablet Take 1 tablet (100 mcg) by mouth once daily. 90 tablet 3    multivitamin tablet Multiple Vitamins TABS   Refills: 0       Active      nystatin-triamcinolone (Mycolog II) cream Apply topically 2 times a day. Apply to affect area twice a day for 7-14 days then as needed for rash. 15 g 1    NIFEdipine ER (Adalat CC) 30 mg 24 hr tablet Take 1 tablet (30 mg) by mouth once daily. Do not crush, chew, or split. 30 tablet 5    rosuvastatin (Crestor) 5 mg tablet Take 0.5 tablets (2.5 mg) by mouth once daily. 45 tablet 0     No current facility-administered medications for this visit.       Review of Systems    REVIEW OF SYSTEMS  GENERAL:  Negative for malaise, significant weight loss, fever  CARDIOVASCULAR: leg swelling   MUSCULOSKELETAL:  Negative for joint pain or swelling, back pain, and muscle pain.  SKIN:  Negative for lesions, rash, and itching  PSYCH:  Negative for sleep disturbance, mood disorder and recent psychosocial stressors  NEURO: Negative, denies any burning, tingling or numbness     Objective:   Vasc: DP and PT pulses are palpable bilateral.  CFT is less than 3 seconds bilateral.  Skin  temperature is warm to cool proximal to distal bilateral.  No edema     Neuro:  Light touch is intact to the foot bilateral.      Derm: Nails 1-5 bilateral are thickened, elongated and crumbly with subungual debris. Skin is supple with normal texture and turgor noted.  Webspaces are clean, dry and intact bilateral.  There are no hyperkeratoses, ulcerations, verruca or other lesions noted.      Ortho: Muscle strength is 3/5 for all pedal groups tested.  Wheelchair bound   Assessment/Plan     Diagnoses and all orders for this visit:  Spastic hemiplegic cerebral palsy (CMS/HCC)  Onychomycosis  Pain in toes of both feet    Toenails are debrided in length and thickness to avoid infection and for pain relief    Samia Vazquez-Chantell, KASSIDY  [address] [place] [address]

## 2024-06-13 DIAGNOSIS — G81.10: Primary | ICD-10-CM

## 2024-06-13 RX ORDER — PNV NO.95/FERROUS FUM/FOLIC AC 28MG-0.8MG
100 TABLET ORAL DAILY
Qty: 30 TABLET | Refills: 1 | Status: SHIPPED | OUTPATIENT
Start: 2024-06-13

## 2024-07-18 ENCOUNTER — APPOINTMENT (OUTPATIENT)
Dept: PODIATRY | Facility: CLINIC | Age: 64
End: 2024-07-18
Payer: MEDICARE

## 2024-07-18 DIAGNOSIS — G80.2 SPASTIC HEMIPLEGIC CEREBRAL PALSY (MULTI): Primary | ICD-10-CM

## 2024-07-18 DIAGNOSIS — M79.674 PAIN IN TOES OF BOTH FEET: ICD-10-CM

## 2024-07-18 DIAGNOSIS — M79.675 PAIN IN TOES OF BOTH FEET: ICD-10-CM

## 2024-07-18 DIAGNOSIS — B35.1 ONYCHOMYCOSIS: ICD-10-CM

## 2024-07-18 PROCEDURE — 11721 DEBRIDE NAIL 6 OR MORE: CPT | Performed by: PODIATRIST

## 2024-07-18 NOTE — PROGRESS NOTES
History Of Present Illness  Jason Cortes is a 63 y.o. male presenting with painful elongated nails.     Past Medical History  He has no past medical history on file.    Surgical History  He has no past surgical history on file.     Social History  He reports that he has never smoked. He has never used smokeless tobacco. He reports current alcohol use. No history on file for drug use.    Family History  Family History   Problem Relation Name Age of Onset    Pancreatic cancer Mother      Hypertension Other Fam Hx     Other (Malignant Neoplasm) Other Fam Hx         Allergies  Patient has no known allergies.    Medications  Current Outpatient Medications   Medication Sig Dispense Refill    cyanocobalamin (Vitamin B-12) 100 mcg tablet Take 1 tablet (100 mcg) by mouth once daily. 30 tablet 1    levothyroxine (Synthroid, Levoxyl) 100 mcg tablet Take 1 tablet (100 mcg) by mouth once daily. 90 tablet 3    multivitamin tablet Multiple Vitamins TABS   Refills: 0       Active      nystatin-triamcinolone (Mycolog II) cream Apply topically 2 times a day. Apply to affect area twice a day for 7-14 days then as needed for rash. 15 g 1    NIFEdipine ER (Adalat CC) 30 mg 24 hr tablet Take 1 tablet (30 mg) by mouth once daily. Do not crush, chew, or split. 30 tablet 5    rosuvastatin (Crestor) 5 mg tablet Take 0.5 tablets (2.5 mg) by mouth once daily. 45 tablet 0     No current facility-administered medications for this visit.       Review of Systems    REVIEW OF SYSTEMS  GENERAL:  Negative for malaise, significant weight loss, fever  CARDIOVASCULAR: leg swelling   MUSCULOSKELETAL:  Negative for joint pain or swelling, back pain, and muscle pain.  SKIN:  Negative for lesions, rash, and itching  PSYCH:  Negative for sleep disturbance, mood disorder and recent psychosocial stressors  NEURO: Negative, denies any burning, tingling or numbness     Objective: More verbal today   Vasc: DP and PT pulses are palpable bilateral.  CFT is less  than 3 seconds bilateral.  Skin temperature is warm to cool proximal to distal bilateral.  No edema     Neuro:  Light touch is intact to the foot bilateral.      Derm: Nails 1-5 bilateral are thickened, elongated and crumbly with subungual debris. Skin is supple with normal texture and turgor noted.  Webspaces are clean, dry and intact bilateral.  There are no hyperkeratoses, ulcerations, verruca or other lesions noted.      Ortho: Muscle strength is 3/5 for all pedal groups tested.  Wheelchair bound   Assessment/Plan     Diagnoses and all orders for this visit:  Spastic hemiplegic cerebral palsy (CMS/HCC)  Onychomycosis  Pain in toes of both feet    Toenails are debrided in length and thickness to avoid infection and for pain relief    Samia Vazquez-Chantell, KASSIDY  [address] [place] [address]

## 2024-10-10 ENCOUNTER — APPOINTMENT (OUTPATIENT)
Dept: PODIATRY | Facility: CLINIC | Age: 64
End: 2024-10-10
Payer: MEDICARE

## 2024-10-10 DIAGNOSIS — M79.675 PAIN IN TOES OF BOTH FEET: ICD-10-CM

## 2024-10-10 DIAGNOSIS — G80.2 SPASTIC HEMIPLEGIC CEREBRAL PALSY (MULTI): ICD-10-CM

## 2024-10-10 DIAGNOSIS — B35.1 ONYCHOMYCOSIS: Primary | ICD-10-CM

## 2024-10-10 DIAGNOSIS — M79.674 PAIN IN TOES OF BOTH FEET: ICD-10-CM

## 2024-10-10 PROCEDURE — 11721 DEBRIDE NAIL 6 OR MORE: CPT | Performed by: PODIATRIST

## 2024-10-10 NOTE — PROGRESS NOTES
History Of Present Illness  Jason Cortes is a 63 y.o. male presenting with painful elongated nails.    PCP Sita Mckeon MD  Last visit 7/18/24     Past Medical History  He has no past medical history on file.    Surgical History  He has no past surgical history on file.     Social History  He reports that he has never smoked. He has never used smokeless tobacco. He reports current alcohol use. No history on file for drug use.    Family History  Family History   Problem Relation Name Age of Onset    Pancreatic cancer Mother      Hypertension Other Fam Hx     Other (Malignant Neoplasm) Other Fam Hx         Allergies  Patient has no known allergies.    Medications  Current Outpatient Medications   Medication Sig Dispense Refill    cyanocobalamin (Vitamin B-12) 100 mcg tablet Take 1 tablet (100 mcg) by mouth once daily. 30 tablet 1    levothyroxine (Synthroid, Levoxyl) 100 mcg tablet Take 1 tablet (100 mcg) by mouth once daily. 90 tablet 3    multivitamin tablet Multiple Vitamins TABS   Refills: 0       Active      nystatin-triamcinolone (Mycolog II) cream Apply topically 2 times a day. Apply to affect area twice a day for 7-14 days then as needed for rash. 15 g 1    NIFEdipine ER (Adalat CC) 30 mg 24 hr tablet Take 1 tablet (30 mg) by mouth once daily. Do not crush, chew, or split. 30 tablet 5    rosuvastatin (Crestor) 5 mg tablet Take 0.5 tablets (2.5 mg) by mouth once daily. 45 tablet 0     No current facility-administered medications for this visit.       Review of Systems    REVIEW OF SYSTEMS  GENERAL:  Negative for malaise, significant weight loss, fever  CARDIOVASCULAR: leg swelling   MUSCULOSKELETAL:  Negative for joint pain or swelling, back pain, and muscle pain.  SKIN:  Negative for lesions, rash, and itching  PSYCH:  Negative for sleep disturbance, mood disorder and recent psychosocial stressors  NEURO: Negative, denies any burning, tingling or numbness     Objective: More verbal today, anxious today     Vasc: DP and PT pulses are palpable bilateral.  CFT is less than 3 seconds bilateral.  Skin temperature is warm to cool proximal to distal bilateral.  No edema     Neuro:  Light touch is intact to the foot bilateral.      Derm: Nails 1-5 bilateral are thickened, elongated and crumbly with subungual debris. Skin is supple with normal texture and turgor noted.  Webspaces are clean, dry and intact bilateral.  There are no hyperkeratoses, ulcerations, verruca or other lesions noted.      Ortho: Muscle strength is 3/5 for all pedal groups tested.  Wheelchair bound   Assessment/Plan     Diagnoses and all orders for this visit:  Spastic hemiplegic cerebral palsy (CMS/HCC)  Onychomycosis  Pain in toes of both feet    Toenails are debrided in length and thickness to avoid infection and for pain relief    Samia Vazquez-Chantell, KASSIDY  [address] [place] [address]

## 2024-12-17 ENCOUNTER — APPOINTMENT (OUTPATIENT)
Dept: PODIATRY | Facility: CLINIC | Age: 64
End: 2024-12-17
Payer: MEDICARE

## 2024-12-17 DIAGNOSIS — M79.674 PAIN IN TOES OF BOTH FEET: ICD-10-CM

## 2024-12-17 DIAGNOSIS — B35.1 ONYCHOMYCOSIS: Primary | ICD-10-CM

## 2024-12-17 DIAGNOSIS — M79.675 PAIN IN TOES OF BOTH FEET: ICD-10-CM

## 2024-12-17 PROCEDURE — 11721 DEBRIDE NAIL 6 OR MORE: CPT | Performed by: PODIATRIST

## 2024-12-17 NOTE — PROGRESS NOTES
History Of Present Illness  Jason Cortes is a 64 y.o. male presenting with painful elongated nails.    PCP Sita Mckeon MD  Last visit 7/18/24     Past Medical History  He has no past medical history on file.    Surgical History  He has no past surgical history on file.     Social History  He reports that he has never smoked. He has never used smokeless tobacco. He reports current alcohol use. No history on file for drug use.    Family History  Family History   Problem Relation Name Age of Onset    Pancreatic cancer Mother      Hypertension Other Fam Hx     Other (Malignant Neoplasm) Other Fam Hx         Allergies  Patient has no known allergies.    Medications  Current Outpatient Medications   Medication Sig Dispense Refill    cyanocobalamin (Vitamin B-12) 100 mcg tablet Take 1 tablet (100 mcg) by mouth once daily. 30 tablet 1    levothyroxine (Synthroid, Levoxyl) 100 mcg tablet Take 1 tablet (100 mcg) by mouth once daily. 90 tablet 3    multivitamin tablet Multiple Vitamins TABS   Refills: 0       Active      nystatin-triamcinolone (Mycolog II) cream Apply topically 2 times a day. Apply to affect area twice a day for 7-14 days then as needed for rash. 15 g 1    NIFEdipine ER (Adalat CC) 30 mg 24 hr tablet Take 1 tablet (30 mg) by mouth once daily. Do not crush, chew, or split. 30 tablet 5    rosuvastatin (Crestor) 5 mg tablet Take 0.5 tablets (2.5 mg) by mouth once daily. 45 tablet 0     No current facility-administered medications for this visit.       Review of Systems    REVIEW OF SYSTEMS  GENERAL:  Negative for malaise, significant weight loss, fever  CARDIOVASCULAR: leg swelling   MUSCULOSKELETAL:  Negative for joint pain or swelling, back pain, and muscle pain.  SKIN:  Negative for lesions, rash, and itching  PSYCH:  Negative for sleep disturbance, mood disorder and recent psychosocial stressors  NEURO: Negative, denies any burning, tingling or numbness     Objective: More verbal today, anxious today     Vasc: DP and PT pulses are palpable bilateral.  CFT is less than 3 seconds bilateral.  Skin temperature is warm to cool proximal to distal bilateral.  No edema     Neuro:  Light touch is intact to the foot bilateral.      Derm: Nails 1-5 bilateral are thickened, elongated and crumbly with subungual debris. Skin is supple with normal texture and turgor noted.  Webspaces are clean, dry and intact bilateral.  There are no hyperkeratoses, ulcerations, verruca or other lesions noted.      Ortho: Muscle strength is 3/5 for all pedal groups tested.  Wheelchair bound, wears AFO   Assessment/Plan     Diagnoses and all orders for this visit:  Spastic hemiplegic cerebral palsy (CMS/HCC)  Onychomycosis  Pain in toes of both feet    Toenails are debrided in length and thickness to avoid infection and for pain relief    Samia Vazquez-Chantell, KASSIDY  [address] [place] [address]

## 2025-02-18 ENCOUNTER — APPOINTMENT (OUTPATIENT)
Dept: PODIATRY | Facility: CLINIC | Age: 65
End: 2025-02-18
Payer: MEDICARE

## 2025-02-25 ENCOUNTER — APPOINTMENT (OUTPATIENT)
Dept: PODIATRY | Facility: CLINIC | Age: 65
End: 2025-02-25
Payer: MEDICARE

## 2025-03-20 ENCOUNTER — APPOINTMENT (OUTPATIENT)
Dept: PODIATRY | Facility: CLINIC | Age: 65
End: 2025-03-20
Payer: MEDICARE

## 2025-03-20 DIAGNOSIS — M79.674 PAIN IN TOES OF BOTH FEET: ICD-10-CM

## 2025-03-20 DIAGNOSIS — B35.1 ONYCHOMYCOSIS: Primary | ICD-10-CM

## 2025-03-20 DIAGNOSIS — M79.675 PAIN IN TOES OF BOTH FEET: ICD-10-CM

## 2025-03-20 PROCEDURE — 11721 DEBRIDE NAIL 6 OR MORE: CPT | Performed by: PODIATRIST

## 2025-03-20 RX ORDER — OLIVE OIL
5 OIL (ML) MISCELLANEOUS EVERY 12 HOURS PRN
COMMUNITY
Start: 2025-01-31 | End: 2026-01-31

## 2025-03-20 RX ORDER — VIT C/E/ZN/COPPR/LUTEIN/ZEAXAN 250MG-90MG
CAPSULE ORAL
COMMUNITY
Start: 2025-03-19

## 2025-03-20 NOTE — PROGRESS NOTES
History Of Present Illness  Jason Cortes is a 64 y.o. male presenting with painful elongated nails.    PCP Sita Mckeon MD  Last visit 7/18/24, next scheduled 4/17/2025     Past Medical History  He has no past medical history on file.    Surgical History  He has no past surgical history on file.     Social History  He reports that he has never smoked. He has never used smokeless tobacco. He reports current alcohol use. No history on file for drug use.    Family History  Family History   Problem Relation Name Age of Onset    Pancreatic cancer Mother      Hypertension Other Fam Hx     Other (Malignant Neoplasm) Other Fam Hx         Allergies  Patient has no known allergies.    Medications  Current Outpatient Medications   Medication Sig Dispense Refill    cyanocobalamin (Vitamin B-12) 100 mcg tablet Take 1 tablet (100 mcg) by mouth once daily. 30 tablet 1    cyanocobalamin (Vitamin B-12) 500 mcg tablet       Ear Drops, carbamide peroxide, 6.5 % otic solution Administer 5 drops into affected ear(s) every 12 hours if needed.      levothyroxine (Synthroid, Levoxyl) 100 mcg tablet Take 1 tablet (100 mcg) by mouth once daily. 90 tablet 3    multivitamin tablet Multiple Vitamins TABS   Refills: 0       Active      nystatin-triamcinolone (Mycolog II) cream Apply topically 2 times a day. Apply to affect area twice a day for 7-14 days then as needed for rash. 15 g 1    NIFEdipine ER (Adalat CC) 30 mg 24 hr tablet Take 1 tablet (30 mg) by mouth once daily. Do not crush, chew, or split. 30 tablet 5    rosuvastatin (Crestor) 5 mg tablet Take 0.5 tablets (2.5 mg) by mouth once daily. 45 tablet 0     No current facility-administered medications for this visit.       Review of Systems    REVIEW OF SYSTEMS  GENERAL:  Negative for malaise, significant weight loss, fever  CARDIOVASCULAR: leg swelling   MUSCULOSKELETAL:  Negative for joint pain or swelling, back pain, and muscle pain.  SKIN:  Negative for lesions, rash, and  itching  PSYCH:  Negative for sleep disturbance, mood disorder and recent psychosocial stressors  NEURO: Negative, denies any burning, tingling or numbness     Objective: More verbal today,   Vasc: DP and PT pulses are palpable bilateral.  CFT is less than 3 seconds bilateral.  Skin temperature is warm to cool proximal to distal bilateral.  No edema     Neuro:  Light touch is intact to the foot bilateral.      Derm: Nails 1-5 bilateral are thickened, elongated and crumbly with subungual debris. Skin is supple with normal texture and turgor noted.  Webspaces are clean, dry and intact bilateral.  There are no hyperkeratoses, ulcerations, verruca or other lesions noted.      Ortho: Muscle strength is 3/5 for all pedal groups tested.  Wheelchair bound, wears AFO   Assessment/Plan     Diagnoses and all orders for this visit:  Spastic hemiplegic cerebral palsy (CMS/HCC)  Onychomycosis  Pain in toes of both feet    Toenails are debrided in length and thickness to avoid infection and for pain relief    Samia Vazquez-Chantell, KASSIDY  [address] [place] [address]